# Patient Record
Sex: FEMALE | Race: WHITE | NOT HISPANIC OR LATINO | Employment: OTHER | URBAN - METROPOLITAN AREA
[De-identification: names, ages, dates, MRNs, and addresses within clinical notes are randomized per-mention and may not be internally consistent; named-entity substitution may affect disease eponyms.]

---

## 2022-06-25 ENCOUNTER — APPOINTMENT (OUTPATIENT)
Dept: GENERAL RADIOLOGY | Facility: CLINIC | Age: 52
End: 2022-06-25
Attending: EMERGENCY MEDICINE
Payer: COMMERCIAL

## 2022-06-25 ENCOUNTER — HOSPITAL ENCOUNTER (EMERGENCY)
Facility: CLINIC | Age: 52
Discharge: HOME | End: 2022-06-25
Attending: PHYSICIAN ASSISTANT | Admitting: PHYSICIAN ASSISTANT
Payer: COMMERCIAL

## 2022-06-25 ENCOUNTER — APPOINTMENT (OUTPATIENT)
Dept: GENERAL RADIOLOGY | Facility: CLINIC | Age: 52
End: 2022-06-25
Attending: PHYSICIAN ASSISTANT
Payer: COMMERCIAL

## 2022-06-25 VITALS
TEMPERATURE: 97.8 F | WEIGHT: 189 LBS | RESPIRATION RATE: 18 BRPM | SYSTOLIC BLOOD PRESSURE: 171 MMHG | HEIGHT: 61 IN | OXYGEN SATURATION: 97 % | DIASTOLIC BLOOD PRESSURE: 96 MMHG | HEART RATE: 83 BPM | BODY MASS INDEX: 35.68 KG/M2

## 2022-06-25 DIAGNOSIS — M25.572 LEFT ANKLE PAIN, UNSPECIFIED CHRONICITY: ICD-10-CM

## 2022-06-25 DIAGNOSIS — M25.572 LEFT ANKLE PAIN: ICD-10-CM

## 2022-06-25 PROCEDURE — 73610 X-RAY EXAM OF ANKLE: CPT | Mod: LT

## 2022-06-25 PROCEDURE — 73630 X-RAY EXAM OF FOOT: CPT | Mod: LT

## 2022-06-25 PROCEDURE — 29515 APPLICATION SHORT LEG SPLINT: CPT | Mod: LT

## 2022-06-25 PROCEDURE — 250N000013 HC RX MED GY IP 250 OP 250 PS 637: Performed by: PHYSICIAN ASSISTANT

## 2022-06-25 PROCEDURE — 99284 EMERGENCY DEPT VISIT MOD MDM: CPT | Mod: 25

## 2022-06-25 RX ORDER — ACETAMINOPHEN 500 MG
1000 TABLET ORAL ONCE
Status: COMPLETED | OUTPATIENT
Start: 2022-06-25 | End: 2022-06-25

## 2022-06-25 RX ADMIN — ACETAMINOPHEN 1000 MG: 500 TABLET, FILM COATED ORAL at 22:36

## 2022-06-25 ASSESSMENT — ENCOUNTER SYMPTOMS: ARTHRALGIAS: 1

## 2022-06-26 NOTE — ED TRIAGE NOTES
Pt presents for complaint of left ankle pain after twisting her ankle at around noon today. Pt states more pain since. Difficulty with ambulation. Describes pain at the left malleius radiating to the left lateral side of the foot and across the top. ABC intact, A&Ox4. Hx of MS.     Triage Assessment     Row Name 06/25/22 2124       Triage Assessment (Adult)    Airway WDL WDL       Respiratory WDL    Respiratory WDL WDL       Skin Circulation/Temperature WDL    Skin Circulation/Temperature WDL WDL       Cardiac WDL    Cardiac WDL WDL       Peripheral/Neurovascular WDL    Peripheral Neurovascular WDL WDL       Cognitive/Neuro/Behavioral WDL    Cognitive/Neuro/Behavioral WDL WDL

## 2022-06-26 NOTE — ED PROVIDER NOTES
"  History     Chief Complaint:  Ankle Pain       HPI   Adriana Garcia is a 51 year old female who presents to the ED PT for evaluation of ankle pain.  Patient reports that around noon today she was walking in heels when she rolled her left ankle.  She notes that she did go down to her knees and did scrape up her right knee.  She has been able to walk on her ankle since the fall, however has had pain doing so.  She did not take anything for her symptoms.  No numbness or tingling.    ROS:  Review of Systems   Musculoskeletal: Positive for arthralgias.   All other systems reviewed and are negative.      Allergies:  No Known Allergies     Medications:    DULoxetine (CYMBALTA) 60 MG capsule  gabapentin (NEURONTIN) 300 MG capsule  IBUPROFEN PO  lisinopril-hydrochlorothiazide (PRINZIDE,ZESTORETIC) 20-12.5 MG per tablet  LORazepam (ATIVAN) 1 MG tablet  naproxen sodium (ALEVE) 220 MG tablet  nortriptyline (PAMELOR) 25 MG capsule  ORDER FOR DME  zolpidem (AMBIEN) 10 MG tablet    Past Medical History:    MS    Social History:  Here with mother    PCP: No Ref-Primary, Physician     Physical Exam     Patient Vitals for the past 24 hrs:   BP Temp Pulse Resp SpO2 Height Weight   06/25/22 2124 (!) 171/96 97.8  F (36.6  C) 83 18 97 % 1.549 m (5' 1\") 85.7 kg (189 lb)        Physical Exam  HENT: mmm  Eyes: PERRL B/L  Neck: Supple  CV: Peripheral pulses in tact and regular  Resp: Speaking in full sentences without any respiratory distress  Ext:  Left ANKLE    No TTP over the inferior 6 cm of the posterior medial malleolus  TTP over the inferior 6 cm of the posterior lateral malleolus  No TTP over the navicular.  TTP base of 5th MT  No TTP fibular head  There is mild soft tissue swelling present over lateral malleolus  This is a closed injury  Able to fire foot/toe flexors and extensors  Sensation and perfusion intact throughout foot  Remainder of the skeletal survey is unremarkable  Skin: warm dry well perfused  Neuro: Alert, no " gross motor or sensory deficits  Psych: Calm  Nursing notes and vital signs reviewed.      Emergency Department Course     Imaging:  Foot XR, G/E 3 views, left   Final Result   IMPRESSION: No acute fracture or dislocation.      XR Ankle Left G/E 3 Views   Final Result   IMPRESSION: Anatomic alignment of the left ankle. No fracture or dislocation. Ankle mortise is symmetric. Talar dome is smooth. Slight soft tissue swelling about the left ankle.           Emergency Department Course:    Reviewed:  I reviewed nursing notes, vitals, past medical history and Care Everywhere    Assessments:   I obtained history and examined the patient as noted above.    I rechecked the patient and explained findings.     Interventions:  Medications   acetaminophen (TYLENOL) tablet 1,000 mg (1,000 mg Oral Given 6/25/22 2236)        Disposition:  The patient was discharged to home.     Impression & Plan      Medical Decision Making:  Adriana Garcia is a 51 year old female who presents to the ED for evaluation of ankle pain after rolling her left ankle.  See HPI as above for additional details.  Vitals and physical exam as above peer differentials broad include fracture, sprain, strain, among others.  Imaging obtained as above is negative for acute bony abnormality.  Suspect sprain based upon history and physical exam.  Gel splint provided.  Patient has crutches.  Weightbearing as tolerated.  Tylenol and ibuprofen for pain.  Follow-up with Ortho as needed.  [facility] patient was safe for discharged home. Discussed reasons to return. All questions answered. Patient discharged to home in stable condition.    Diagnosis:    ICD-10-CM    1. Left ankle pain  M25.572         Discharge Medications:  New Prescriptions    No medications on file        6/25/2022   Sher Forman PA-C     This record was created at least in part using electronic voice recognition software, so please excuse any typographical errors.       Sher Forman PA-C  06/25/22  [id number]

## 2022-06-26 NOTE — DISCHARGE INSTRUCTIONS
Use Tylenol and ibuprofen for pain.  Ambulate with ankle stirrup for comfort. You can be weightbearing as tolerated.  Apply ice to the ankle, rest, elevate.   Initial (On Arrival)